# Patient Record
Sex: FEMALE | Race: WHITE | URBAN - METROPOLITAN AREA
[De-identification: names, ages, dates, MRNs, and addresses within clinical notes are randomized per-mention and may not be internally consistent; named-entity substitution may affect disease eponyms.]

---

## 2022-05-19 ENCOUNTER — LAB OUTSIDE AN ENCOUNTER (OUTPATIENT)
Dept: URBAN - METROPOLITAN AREA CLINIC 19 | Facility: CLINIC | Age: 47
End: 2022-05-19

## 2022-05-19 ENCOUNTER — OFFICE VISIT (OUTPATIENT)
Dept: URBAN - METROPOLITAN AREA CLINIC 19 | Facility: CLINIC | Age: 47
End: 2022-05-19
Payer: COMMERCIAL

## 2022-05-19 VITALS
BODY MASS INDEX: 23.53 KG/M2 | SYSTOLIC BLOOD PRESSURE: 116 MMHG | TEMPERATURE: 98.5 F | HEIGHT: 65 IN | WEIGHT: 141.2 LBS | DIASTOLIC BLOOD PRESSURE: 74 MMHG

## 2022-05-19 DIAGNOSIS — Z12.11 COLON CANCER SCREENING: ICD-10-CM

## 2022-05-19 DIAGNOSIS — R10.13 EPIGASTRIC PAIN: ICD-10-CM

## 2022-05-19 DIAGNOSIS — R11.0 NAUSEA: ICD-10-CM

## 2022-05-19 DIAGNOSIS — K59.09 CHANGE IN BOWEL MOVEMENTS INTERMITTENT CONSTIPATION. URGENCY IN THE MORNING.: ICD-10-CM

## 2022-05-19 PROCEDURE — 99204 OFFICE O/P NEW MOD 45 MIN: CPT | Performed by: INTERNAL MEDICINE

## 2022-05-19 RX ORDER — ONDANSETRON 4 MG/1
1 TABLET ON THE TONGUE AND ALLOW TO DISSOLVE TABLET, ORALLY DISINTEGRATING ORAL
Qty: 20 | Refills: 1 | OUTPATIENT
Start: 2022-05-19

## 2022-05-19 RX ORDER — FLUTICASONE PROPIONATE 50 UG/1
SPRAY, METERED NASAL
Qty: 0 | Refills: 0 | Status: ACTIVE | COMMUNITY
Start: 1900-01-01

## 2022-05-19 RX ORDER — PANTOPRAZOLE SODIUM 40 MG/1
1 TABLET TABLET, DELAYED RELEASE ORAL ONCE A DAY
Qty: 90 TABLET | Refills: 1 | OUTPATIENT
Start: 2022-05-19

## 2022-05-19 RX ORDER — BUSPIRONE HYDROCHLORIDE 10 MG/1
1 TABLET TABLET ORAL TWICE A DAY
Status: ACTIVE | COMMUNITY

## 2022-05-19 RX ORDER — PANTOPRAZOLE SODIUM 20 MG/1
1 TABLET TABLET, DELAYED RELEASE ORAL ONCE A DAY
Status: ACTIVE | COMMUNITY

## 2022-05-19 RX ORDER — LINACLOTIDE 145 UG/1
1 CAPSULE AT LEAST 30 MINUTES BEFORE THE FIRST MEAL OF THE DAY ON AN EMPTY STOMACH CAPSULE, GELATIN COATED ORAL ONCE A DAY
Qty: 30 | Refills: 3 | OUTPATIENT
Start: 2022-05-19 | End: 2022-09-16

## 2022-05-19 NOTE — HPI-TODAY'S VISIT:
Ms. Hewitt is a 47-year-old female who presents today for epigastric abdominal pain and constipation.  Patient reports she has been having epigastric abdominal pain for almost a year.  She also reports having nausea on and off.  Patient reports she was started on pantoprazole 40 mg and then decreased to 20 mg and off the medication.  She is now on 20 mg of pantoprazole and she is still having symptoms.  No heartburn or dysphagia.  Rare vomiting.  EGD years ago.  She reports at age 11 she had gastric ulcers.  Patient does report she was taking a lot of ibuprofen a year ago for knee pain and is not on it now.  Patient also reports she had constipation for years.  Patient reports she had surgery for endometriosis and is wondering if that is related.  Patient reports taking MiraLAX every night.  Patient reports eating with MiraLAX half a bowel movement every 1 to 3 days.  Some straining still.  Rare blood.  Lower abdominal pain and bloating.  Patient is never had a colonoscopy.  No family history of colon cancer.  Patient denies cardiac/kidney/lung disease, diabetes, blood thinners, and home O2.

## 2022-05-25 PROBLEM — 422587007: Status: ACTIVE | Noted: 2022-05-19

## 2022-05-25 PROBLEM — 79922009: Status: ACTIVE | Noted: 2022-05-19

## 2022-06-08 ENCOUNTER — OFFICE VISIT (OUTPATIENT)
Dept: URBAN - METROPOLITAN AREA SURGERY CENTER 31 | Facility: SURGERY CENTER | Age: 47
End: 2022-06-08
Payer: COMMERCIAL

## 2022-06-08 DIAGNOSIS — Z53.8 FAILED ATTEMPTED SURGICAL PROCEDURE: ICD-10-CM

## 2022-06-08 DIAGNOSIS — Z12.11 COLON CANCER SCREENING: ICD-10-CM

## 2022-06-08 DIAGNOSIS — K29.60 ADENOPAPILLOMATOSIS GASTRICA: ICD-10-CM

## 2022-06-08 PROBLEM — 305058001: Status: ACTIVE | Noted: 2022-05-19

## 2022-06-08 PROBLEM — 82934008: Status: ACTIVE | Noted: 2022-05-19

## 2022-06-08 PROCEDURE — 43239 EGD BIOPSY SINGLE/MULTIPLE: CPT | Performed by: INTERNAL MEDICINE

## 2022-06-08 PROCEDURE — 45378 DIAGNOSTIC COLONOSCOPY: CPT | Performed by: INTERNAL MEDICINE

## 2022-06-08 PROCEDURE — G8907 PT DOC NO EVENTS ON DISCHARG: HCPCS | Performed by: INTERNAL MEDICINE

## 2022-06-08 RX ORDER — PANTOPRAZOLE SODIUM 40 MG/1
1 TABLET TABLET, DELAYED RELEASE ORAL ONCE A DAY
Qty: 90 TABLET | Refills: 1 | Status: ACTIVE | COMMUNITY
Start: 2022-05-19

## 2022-06-08 RX ORDER — FLUTICASONE PROPIONATE 50 UG/1
SPRAY, METERED NASAL
Qty: 0 | Refills: 0 | Status: ACTIVE | COMMUNITY
Start: 1900-01-01

## 2022-06-08 RX ORDER — ONDANSETRON 4 MG/1
1 TABLET ON THE TONGUE AND ALLOW TO DISSOLVE TABLET, ORALLY DISINTEGRATING ORAL
Qty: 20 | Refills: 1 | Status: ACTIVE | COMMUNITY
Start: 2022-05-19

## 2022-06-08 RX ORDER — PANTOPRAZOLE SODIUM 20 MG/1
1 TABLET TABLET, DELAYED RELEASE ORAL ONCE A DAY
Status: ACTIVE | COMMUNITY

## 2022-06-08 RX ORDER — BUSPIRONE HYDROCHLORIDE 10 MG/1
1 TABLET TABLET ORAL TWICE A DAY
Status: ACTIVE | COMMUNITY

## 2022-06-08 RX ORDER — LINACLOTIDE 145 UG/1
1 CAPSULE AT LEAST 30 MINUTES BEFORE THE FIRST MEAL OF THE DAY ON AN EMPTY STOMACH CAPSULE, GELATIN COATED ORAL ONCE A DAY
Qty: 30 | Refills: 3 | Status: ACTIVE | COMMUNITY
Start: 2022-05-19 | End: 2022-09-16

## 2022-06-09 ENCOUNTER — OFFICE VISIT (OUTPATIENT)
Dept: URBAN - METROPOLITAN AREA SURGERY CENTER 31 | Facility: SURGERY CENTER | Age: 47
End: 2022-06-09
Payer: COMMERCIAL

## 2022-06-09 DIAGNOSIS — Z12.11 COLON CANCER SCREENING: ICD-10-CM

## 2022-06-09 PROCEDURE — 45378 DIAGNOSTIC COLONOSCOPY: CPT | Performed by: INTERNAL MEDICINE

## 2022-06-09 PROCEDURE — G8907 PT DOC NO EVENTS ON DISCHARG: HCPCS | Performed by: INTERNAL MEDICINE

## 2022-06-09 RX ORDER — FLUTICASONE PROPIONATE 50 UG/1
SPRAY, METERED NASAL
Qty: 0 | Refills: 0 | Status: ACTIVE | COMMUNITY
Start: 1900-01-01

## 2022-06-09 RX ORDER — BUSPIRONE HYDROCHLORIDE 10 MG/1
1 TABLET TABLET ORAL TWICE A DAY
Status: ACTIVE | COMMUNITY

## 2022-06-09 RX ORDER — PANTOPRAZOLE SODIUM 20 MG/1
1 TABLET TABLET, DELAYED RELEASE ORAL ONCE A DAY
Status: ACTIVE | COMMUNITY

## 2022-06-09 RX ORDER — ONDANSETRON 4 MG/1
1 TABLET ON THE TONGUE AND ALLOW TO DISSOLVE TABLET, ORALLY DISINTEGRATING ORAL
Qty: 20 | Refills: 1 | Status: ACTIVE | COMMUNITY
Start: 2022-05-19

## 2022-06-09 RX ORDER — PANTOPRAZOLE SODIUM 40 MG/1
1 TABLET TABLET, DELAYED RELEASE ORAL ONCE A DAY
Qty: 90 TABLET | Refills: 1 | Status: ACTIVE | COMMUNITY
Start: 2022-05-19

## 2022-06-09 RX ORDER — LINACLOTIDE 145 UG/1
1 CAPSULE AT LEAST 30 MINUTES BEFORE THE FIRST MEAL OF THE DAY ON AN EMPTY STOMACH CAPSULE, GELATIN COATED ORAL ONCE A DAY
Qty: 30 | Refills: 3 | Status: ACTIVE | COMMUNITY
Start: 2022-05-19 | End: 2022-09-16

## 2023-11-13 ENCOUNTER — OFFICE VISIT (OUTPATIENT)
Dept: URBAN - METROPOLITAN AREA CLINIC 19 | Facility: CLINIC | Age: 48
End: 2023-11-13
Payer: COMMERCIAL

## 2023-11-13 VITALS
BODY MASS INDEX: 23.32 KG/M2 | OXYGEN SATURATION: 100 % | DIASTOLIC BLOOD PRESSURE: 76 MMHG | HEIGHT: 65 IN | HEART RATE: 73 BPM | SYSTOLIC BLOOD PRESSURE: 120 MMHG | WEIGHT: 140 LBS

## 2023-11-13 DIAGNOSIS — R10.13 EPIGASTRIC PAIN: ICD-10-CM

## 2023-11-13 DIAGNOSIS — R11.0 NAUSEA: ICD-10-CM

## 2023-11-13 DIAGNOSIS — K59.04 CHRONIC IDIOPATHIC CONSTIPATION: ICD-10-CM

## 2023-11-13 DIAGNOSIS — R14.0 BLOATING: ICD-10-CM

## 2023-11-13 PROCEDURE — 99214 OFFICE O/P EST MOD 30 MIN: CPT | Performed by: NURSE PRACTITIONER

## 2023-11-13 PROCEDURE — 91065 BREATH HYDROGEN/METHANE TEST: CPT | Performed by: NURSE PRACTITIONER

## 2023-11-13 RX ORDER — LINACLOTIDE 145 UG/1
1 CAPSULE AT LEAST 30 MINUTES BEFORE THE FIRST MEAL OF THE DAY ON AN EMPTY STOMACH CAPSULE, GELATIN COATED ORAL ONCE A DAY
Qty: 30 | Refills: 1 | OUTPATIENT
Start: 2023-11-13 | End: 2024-01-12

## 2023-11-13 RX ORDER — ONDANSETRON 4 MG/1
1 TABLET ON THE TONGUE AND ALLOW TO DISSOLVE TABLET, ORALLY DISINTEGRATING ORAL
Qty: 20 | Refills: 1 | Status: ACTIVE | COMMUNITY
Start: 2022-05-19

## 2023-11-13 RX ORDER — PANTOPRAZOLE SODIUM 20 MG/1
1 TABLET TABLET, DELAYED RELEASE ORAL ONCE A DAY
Status: ON HOLD | COMMUNITY

## 2023-11-13 RX ORDER — PANTOPRAZOLE SODIUM 40 MG/1
1 TABLET TABLET, DELAYED RELEASE ORAL ONCE A DAY
Qty: 90 TABLET | Refills: 1 | Status: ON HOLD | COMMUNITY
Start: 2022-05-19

## 2023-11-13 RX ORDER — HYOSCYAMINE SULFATE 0.12 MG/1
1 TABLET UNDER THE TONGUE AND ALLOW TO DISSOLVE AS NEEDED TABLET SUBLINGUAL THREE TIMES A DAY
Qty: 60 | Refills: 1 | OUTPATIENT
Start: 2023-11-13 | End: 2024-01-12

## 2023-11-13 RX ORDER — BUSPIRONE HYDROCHLORIDE 10 MG/1
1 TABLET TABLET ORAL TWICE A DAY
Status: ACTIVE | COMMUNITY

## 2023-11-13 RX ORDER — FLUTICASONE PROPIONATE 50 UG/1
SPRAY, METERED NASAL
Qty: 0 | Refills: 0 | Status: ACTIVE | COMMUNITY
Start: 1900-01-01

## 2023-11-13 RX ORDER — PANTOPRAZOLE SODIUM 20 MG/1
1 TABLET TABLET, DELAYED RELEASE ORAL ONCE A DAY
Qty: 30 | Refills: 1 | OUTPATIENT
Start: 2023-11-13

## 2023-11-13 NOTE — HPI-TODAY'S VISIT:
48-year-old female presents to the office for abdominal pain. She was last seen in the office 5/19/2022 by Lucero Martin for epigastric pain, nausea, CIC.  She complains of epigastric pain for the last month, sharp, at random, nothing makes it better/worse. Nausea has been present for a few months, intermittent, wakes up nauseated, eating makes it worse. She takes NSAIDS prn, not taking any acid medications. Has been taking her dogs zofran.  Has lower abdominal pain for several months, constant soreness, worse with physical activity.  Stools are every 2-3 days, formed/hard, takes Miralax daily. Has previously tried Linzess with improvement.  Denies bloody/black stools stools, wt loss.  PLAN h pylori breath test, Linzess, levsin  Previous procedures Colonoscopy 6/9/2022 with Dr. Ruano colon was normal, medium hemorrhoids EGD 6/2022 localized gastritis, duodenal biopsy negative, stomach biopsy mild chronic inflammation.

## 2023-11-14 ENCOUNTER — TELEPHONE ENCOUNTER (OUTPATIENT)
Dept: URBAN - METROPOLITAN AREA CLINIC 19 | Facility: CLINIC | Age: 48
End: 2023-11-14

## 2023-11-15 ENCOUNTER — ERX REFILL RESPONSE (OUTPATIENT)
Dept: URBAN - METROPOLITAN AREA CLINIC 19 | Facility: CLINIC | Age: 48
End: 2023-11-15

## 2023-11-15 RX ORDER — PANTOPRAZOLE SODIUM 20 MG/1
TAKE 1 TABLET BY MOUTH EVERY DAY TABLET, DELAYED RELEASE ORAL
Qty: 90 TABLET | Refills: 0 | OUTPATIENT

## 2023-11-15 RX ORDER — PANTOPRAZOLE SODIUM 20 MG/1
1 TABLET TABLET, DELAYED RELEASE ORAL ONCE A DAY
Qty: 30 | Refills: 1 | OUTPATIENT

## 2023-11-21 ENCOUNTER — TELEPHONE ENCOUNTER (OUTPATIENT)
Dept: URBAN - METROPOLITAN AREA CLINIC 19 | Facility: CLINIC | Age: 48
End: 2023-11-21

## 2023-11-21 LAB — H PYLORI BREATH TEST: DETECTED

## 2023-11-21 RX ORDER — AMOXICILLIN 500 MG/1
2 CAPSULES CAPSULE ORAL TWICE A DAY
Qty: 56 CAPSULE | Refills: 0 | OUTPATIENT
Start: 2023-11-21 | End: 2023-12-05

## 2023-11-21 RX ORDER — PANTOPRAZOLE SODIUM 40 MG/1
1 TABLET TABLET, DELAYED RELEASE ORAL TWICE A DAY
Qty: 28 TABLET | Refills: 0 | OUTPATIENT
Start: 2023-11-21

## 2023-11-21 RX ORDER — CLARITHROMYCIN 500 MG/1
1 TABLET TABLET, FILM COATED ORAL
Qty: 28 TABLET | Refills: 0 | OUTPATIENT
Start: 2023-11-21 | End: 2023-12-05

## 2023-11-28 ENCOUNTER — TELEPHONE ENCOUNTER (OUTPATIENT)
Dept: URBAN - METROPOLITAN AREA CLINIC 19 | Facility: CLINIC | Age: 48
End: 2023-11-28

## 2023-11-28 RX ORDER — LINACLOTIDE 145 UG/1
1 CAPSULE AT LEAST 30 MINUTES BEFORE THE FIRST MEAL OF THE DAY ON AN EMPTY STOMACH CAPSULE, GELATIN COATED ORAL ONCE A DAY
Qty: 30 | Refills: 0
Start: 2023-11-13 | End: 2023-12-28

## 2023-12-27 ENCOUNTER — TELEPHONE ENCOUNTER (OUTPATIENT)
Dept: URBAN - METROPOLITAN AREA CLINIC 19 | Facility: CLINIC | Age: 48
End: 2023-12-27

## 2024-01-26 ENCOUNTER — OFFICE VISIT (OUTPATIENT)
Dept: URBAN - METROPOLITAN AREA CLINIC 19 | Facility: CLINIC | Age: 49
End: 2024-01-26
Payer: COMMERCIAL

## 2024-01-26 VITALS
HEIGHT: 65 IN | DIASTOLIC BLOOD PRESSURE: 68 MMHG | TEMPERATURE: 98.1 F | HEART RATE: 63 BPM | WEIGHT: 140 LBS | SYSTOLIC BLOOD PRESSURE: 112 MMHG | BODY MASS INDEX: 23.32 KG/M2

## 2024-01-26 DIAGNOSIS — B96.81 H PYLORI: ICD-10-CM

## 2024-01-26 DIAGNOSIS — R11.0 NAUSEA: ICD-10-CM

## 2024-01-26 DIAGNOSIS — K58.1 IRRITABLE BOWEL SYNDROME WITH CONSTIPATION: ICD-10-CM

## 2024-01-26 PROCEDURE — 99213 OFFICE O/P EST LOW 20 MIN: CPT | Performed by: NURSE PRACTITIONER

## 2024-01-26 RX ORDER — ONDANSETRON 4 MG/1
1 TABLET ON THE TONGUE AND ALLOW TO DISSOLVE TABLET, ORALLY DISINTEGRATING ORAL
Qty: 90 | Refills: 3
Start: 2022-05-19

## 2024-01-26 RX ORDER — BUSPIRONE HYDROCHLORIDE 10 MG/1
1 TABLET TABLET ORAL TWICE A DAY
Status: ACTIVE | COMMUNITY

## 2024-01-26 RX ORDER — FLUTICASONE PROPIONATE 50 UG/1
SPRAY, METERED NASAL
Qty: 0 | Refills: 0 | Status: ACTIVE | COMMUNITY
Start: 1900-01-01

## 2024-01-26 RX ORDER — PANTOPRAZOLE SODIUM 40 MG/1
1 TABLET TABLET, DELAYED RELEASE ORAL TWICE A DAY
Qty: 28 TABLET | Refills: 0 | Status: ACTIVE | COMMUNITY
Start: 2023-11-21

## 2024-01-26 RX ORDER — ONDANSETRON 4 MG/1
1 TABLET ON THE TONGUE AND ALLOW TO DISSOLVE TABLET, ORALLY DISINTEGRATING ORAL
Qty: 20 | Refills: 1 | Status: ACTIVE | COMMUNITY
Start: 2022-05-19

## 2024-01-26 NOTE — HPI-TODAY'S VISIT:
Ms. Hewitt is a 48-year-old female with PMH of CIC who presents today for follow-up.  She was last seen in GI clinic by Keira on 11/13/2023 for abdominal pain.  She complained of epigastric pain for over a month and intermittent nausea.  Also reported lower abdominal pain for several months worse with physical activity.  Reported stools every 2 to 3 days formed/hard, takes MiraLAX daily.  Had previously tried Linzess with improvement. Linzess 145 mcg/day ordered, and Levsin. 11/21/2023 H. pylori breath test was positive.  Triple therapy with amoxicillin/clarithromycin/pantoprazole for 2 weeks ordered.  She completed therapy.  She continues on pantoprazole. Today she reports that with the Linzess 145 mcg/day, she will have a BM about every 3 days and not a complete BM.  Still with very hard stools and a lot of straining.  Sometimes blood on tissue when wiping.  Reporting nausea which is more of an issue and decreased appetite.    Previous procedures Colonoscopy 6/9/2022 with Dr. Ruano colon was normal, medium hemorrhoids EGD 6/2022 localized gastritis, duodenal biopsy negative, stomach biopsy mild chronic inflammation.

## 2024-02-05 LAB — H PYLORI BREATH TEST: NOT DETECTED

## 2024-03-08 ENCOUNTER — OV EP (OUTPATIENT)
Dept: URBAN - METROPOLITAN AREA CLINIC 128 | Facility: CLINIC | Age: 49
End: 2024-03-08

## 2024-03-08 VITALS
SYSTOLIC BLOOD PRESSURE: 110 MMHG | TEMPERATURE: 97.5 F | WEIGHT: 136.4 LBS | DIASTOLIC BLOOD PRESSURE: 68 MMHG | HEIGHT: 65 IN | BODY MASS INDEX: 22.73 KG/M2

## 2024-03-08 RX ORDER — BUSPIRONE HYDROCHLORIDE 10 MG/1
1 TABLET TABLET ORAL TWICE A DAY
Status: ACTIVE | COMMUNITY

## 2024-03-08 RX ORDER — FLUTICASONE PROPIONATE 50 UG/1
SPRAY, METERED NASAL
Qty: 0 | Refills: 0 | Status: ACTIVE | COMMUNITY
Start: 1900-01-01

## 2024-03-08 RX ORDER — PANTOPRAZOLE SODIUM 40 MG/1
1 TABLET TABLET, DELAYED RELEASE ORAL TWICE A DAY
Qty: 28 TABLET | Refills: 0 | Status: ACTIVE | COMMUNITY
Start: 2023-11-21

## 2024-03-08 RX ORDER — ONDANSETRON 4 MG/1
1 TABLET ON THE TONGUE AND ALLOW TO DISSOLVE TABLET, ORALLY DISINTEGRATING ORAL
Qty: 90 | Refills: 3 | Status: ACTIVE | COMMUNITY
Start: 2022-05-19

## 2024-03-13 ENCOUNTER — OV EP (OUTPATIENT)
Dept: URBAN - METROPOLITAN AREA CLINIC 19 | Facility: CLINIC | Age: 49
End: 2024-03-13
Payer: COMMERCIAL

## 2024-03-13 VITALS
OXYGEN SATURATION: 98 % | HEART RATE: 88 BPM | BODY MASS INDEX: 22.86 KG/M2 | TEMPERATURE: 98.1 F | WEIGHT: 137.2 LBS | DIASTOLIC BLOOD PRESSURE: 72 MMHG | HEIGHT: 65 IN | SYSTOLIC BLOOD PRESSURE: 126 MMHG

## 2024-03-13 DIAGNOSIS — K59.04 CHRONIC IDIOPATHIC CONSTIPATION: ICD-10-CM

## 2024-03-13 PROCEDURE — 99212 OFFICE O/P EST SF 10 MIN: CPT | Performed by: NURSE PRACTITIONER

## 2024-03-13 RX ORDER — ONDANSETRON 4 MG/1
1 TABLET ON THE TONGUE AND ALLOW TO DISSOLVE TABLET, ORALLY DISINTEGRATING ORAL
Qty: 90 | Refills: 3 | Status: ACTIVE | COMMUNITY
Start: 2022-05-19

## 2024-03-13 RX ORDER — BUSPIRONE HYDROCHLORIDE 10 MG/1
1 TABLET TABLET ORAL TWICE A DAY
Status: ACTIVE | COMMUNITY

## 2024-03-13 RX ORDER — PANTOPRAZOLE SODIUM 40 MG/1
1 TABLET TABLET, DELAYED RELEASE ORAL TWICE A DAY
Qty: 28 TABLET | Refills: 0 | Status: ACTIVE | COMMUNITY
Start: 2023-11-21

## 2024-03-13 RX ORDER — FLUTICASONE PROPIONATE 50 UG/1
SPRAY, METERED NASAL
Qty: 0 | Refills: 0 | Status: ACTIVE | COMMUNITY
Start: 1900-01-01

## 2024-03-13 NOTE — HPI-TODAY'S VISIT:
Ms. Hewitt is a 49-year-old female with PMH of CIC who presents today for follow-up.   She was seen in GI clinic by Keira on 11/13/2023 for abdominal pain. She complained of epigastric pain for over a month and intermittent nausea. Also reported lower abdominal pain for several months worse with physical activity. Reported stools every 2 to 3 days formed/hard, despite MiraLAX daily.   11/21/2023 H. pylori breath test was positive. Triple therapy with amoxicillin/clarithromycin/pantoprazole for 2 weeks completed.   1/31/2024 H pylori breath test negative  She reports Linzess 290mcg/day at first caused her diarrhea then stopped working. Does ECT for depression so did get anethesia for these treatments. Wonders if this could be affecting her bowels. She reports she took [facility] colon prep which did end up giving her a good BM. That was a week ago. Has been having a regular BM since then. But worries she will go back to constipation again and what she can take since Linzess stopped working.    Previous procedures Colonoscopy 6/9/2022 with Dr. Ruano colon was normal, medium hemorrhoids EGD 6/2022 localized gastritis, duodenal biopsy negative, stomach biopsy mild chronic inflammation.

## 2024-07-11 ENCOUNTER — DASHBOARD ENCOUNTERS (OUTPATIENT)
Age: 49
End: 2024-07-11

## 2024-07-11 ENCOUNTER — OFFICE VISIT (OUTPATIENT)
Dept: URBAN - METROPOLITAN AREA CLINIC 19 | Facility: CLINIC | Age: 49
End: 2024-07-11
Payer: COMMERCIAL

## 2024-07-11 VITALS
OXYGEN SATURATION: 100 % | SYSTOLIC BLOOD PRESSURE: 130 MMHG | HEIGHT: 65 IN | TEMPERATURE: 98 F | DIASTOLIC BLOOD PRESSURE: 80 MMHG | BODY MASS INDEX: 22.99 KG/M2 | WEIGHT: 138 LBS | HEART RATE: 60 BPM

## 2024-07-11 DIAGNOSIS — K59.04 CHRONIC IDIOPATHIC CONSTIPATION: ICD-10-CM

## 2024-07-11 PROCEDURE — 99213 OFFICE O/P EST LOW 20 MIN: CPT | Performed by: NURSE PRACTITIONER

## 2024-07-11 RX ORDER — LUBIPROSTONE 24 UG/1
1 CAPSULE WITH FOOD AND WATER CAPSULE, GELATIN COATED ORAL TWICE A DAY
Qty: 60 | Refills: 3 | OUTPATIENT
Start: 2024-07-11 | End: 2024-11-07

## 2024-07-11 RX ORDER — PANTOPRAZOLE SODIUM 40 MG/1
1 TABLET TABLET, DELAYED RELEASE ORAL TWICE A DAY
Qty: 28 TABLET | Refills: 0 | Status: ACTIVE | COMMUNITY
Start: 2023-11-21

## 2024-07-11 RX ORDER — BUSPIRONE HYDROCHLORIDE 10 MG/1
1 TABLET TABLET ORAL TWICE A DAY
Status: ACTIVE | COMMUNITY

## 2024-07-11 RX ORDER — ONDANSETRON 4 MG/1
1 TABLET ON THE TONGUE AND ALLOW TO DISSOLVE TABLET, ORALLY DISINTEGRATING ORAL
Qty: 90 | Refills: 3 | Status: ACTIVE | COMMUNITY
Start: 2022-05-19

## 2024-07-11 RX ORDER — FLUTICASONE PROPIONATE 50 UG/1
SPRAY, METERED NASAL
Qty: 0 | Refills: 0 | Status: ACTIVE | COMMUNITY
Start: 1900-01-01

## 2024-07-11 NOTE — HPI-TODAY'S VISIT:
Ms. Hewitt is a 49-year-old female with PMH of CIC who presents today for severe constipation.  She was last seen in GI clinic 3/13/2024 and at that time reported no relief with Linzess I gave her Ibsrela samples to try and told her to call to let me know if it was effective or not.  She is here today with severe constipation to the point she showed up at [facility]. Causing her reflux and decreased appetite. Causing nausea and headaches.  Tried and failed all OTC and herbals and no relief. Was given lactulose in ER but not helping much. Taking TID. Had a small pellet BM yesterday.  States Ibsrela did help some but she called her insurance and they would not approve.             Previous history Colonoscopy 6/9/2022 with Dr. Ruano colon was normal, medium hemorrhoids EGD 6/2022 localized gastritis, duodenal biopsy negative, stomach biopsy mild chronic inflammation... Seen in GI clinic by Keira on 11/13/2023 for abdominal pain. She complained of epigastric pain for over a month and intermittent nausea. Also reported lower abdominal pain for several months worse with physical activity. Reported stools every 2 to 3 days formed/hard, despite MiraLAX daily.   11/21/2023 H. pylori breath test was positive. Triple therapy with amoxicillin/clarithromycin/pantoprazole for 2 weeks completed.   1/31/2024 H pylori breath test negative  She reported Linzess 290mcg/day at first caused her diarrhea then stopped working. Does ECT for depression so did get anethesia for these treatments.

## 2024-07-11 NOTE — PHYSICAL EXAM GASTROINTESTINAL
Abdomen , soft, diffuse abdominal tenderness, nondistended , no guarding or rigidity , Liver and Spleen,  no hepatosplenomegaly

## 2024-07-12 ENCOUNTER — TELEPHONE ENCOUNTER (OUTPATIENT)
Dept: URBAN - METROPOLITAN AREA CLINIC 19 | Facility: CLINIC | Age: 49
End: 2024-07-12

## 2024-07-12 LAB
A/G RATIO: 1.8
ALBUMIN: 4.6
ALKALINE PHOSPHATASE: 46
ALT (SGPT): 74
AST (SGOT): 61
BILIRUBIN, TOTAL: 0.5
BUN/CREATININE RATIO: 16
BUN: 17
CALCIUM: 9.8
CARBON DIOXIDE, TOTAL: 26
CHLORIDE: 101
CREATININE: 1.08
EGFR: 64
GLOBULIN, TOTAL: 2.6
GLUCOSE: 95
HEMATOCRIT: 45.7
HEMOGLOBIN: 14.5
MCH: 29.1
MCHC: 31.7
MCV: 91.6
MPV: 9.2
PLATELET COUNT: 344
POTASSIUM: 5
PROTEIN, TOTAL: 7.2
RDW: 12.6
RED BLOOD CELL COUNT: 4.99
SODIUM: 139
TSH W/REFLEX TO FT4: 2.84
WHITE BLOOD CELL COUNT: 5.6

## 2024-07-29 ENCOUNTER — TELEPHONE ENCOUNTER (OUTPATIENT)
Dept: URBAN - METROPOLITAN AREA MEDICAL CENTER 28 | Facility: MEDICAL CENTER | Age: 49
End: 2024-07-29

## 2024-07-31 ENCOUNTER — OFFICE VISIT (OUTPATIENT)
Dept: URBAN - METROPOLITAN AREA CLINIC 18 | Facility: CLINIC | Age: 49
End: 2024-07-31

## 2024-08-07 ENCOUNTER — OFFICE VISIT (OUTPATIENT)
Dept: URBAN - METROPOLITAN AREA CLINIC 18 | Facility: CLINIC | Age: 49
End: 2024-08-07

## 2024-08-21 ENCOUNTER — OFFICE VISIT (OUTPATIENT)
Dept: URBAN - METROPOLITAN AREA CLINIC 18 | Facility: CLINIC | Age: 49
End: 2024-08-21
Payer: COMMERCIAL

## 2024-08-21 DIAGNOSIS — R74.8 ABNORMAL LIVER ENZYMES: ICD-10-CM

## 2024-08-21 PROCEDURE — 76705 ECHO EXAM OF ABDOMEN: CPT | Performed by: INTERNAL MEDICINE

## 2024-10-10 ENCOUNTER — OFFICE VISIT (OUTPATIENT)
Dept: URBAN - METROPOLITAN AREA CLINIC 19 | Facility: CLINIC | Age: 49
End: 2024-10-10
Payer: COMMERCIAL

## 2024-10-10 VITALS
HEIGHT: 65 IN | HEART RATE: 81 BPM | TEMPERATURE: 97 F | SYSTOLIC BLOOD PRESSURE: 110 MMHG | WEIGHT: 139.2 LBS | BODY MASS INDEX: 23.19 KG/M2 | DIASTOLIC BLOOD PRESSURE: 80 MMHG

## 2024-10-10 DIAGNOSIS — R10.9 ABDOMINAL PAIN: ICD-10-CM

## 2024-10-10 DIAGNOSIS — K59.04 CHRONIC IDIOPATHIC CONSTIPATION: ICD-10-CM

## 2024-10-10 PROCEDURE — 99214 OFFICE O/P EST MOD 30 MIN: CPT | Performed by: NURSE PRACTITIONER

## 2024-10-10 RX ORDER — PANTOPRAZOLE SODIUM 40 MG/1
1 TABLET TABLET, DELAYED RELEASE ORAL TWICE A DAY
Qty: 28 TABLET | Refills: 0 | Status: ACTIVE | COMMUNITY
Start: 2023-11-21

## 2024-10-10 RX ORDER — BUSPIRONE HYDROCHLORIDE 10 MG/1
1 TABLET TABLET ORAL TWICE A DAY
Status: ACTIVE | COMMUNITY

## 2024-10-10 RX ORDER — LUBIPROSTONE 24 UG/1
1 CAPSULE WITH FOOD AND WATER CAPSULE, GELATIN COATED ORAL TWICE A DAY
Qty: 60 | Refills: 3 | Status: ACTIVE | COMMUNITY
Start: 2024-07-11 | End: 2024-11-07

## 2024-10-10 RX ORDER — LUBIPROSTONE 24 UG/1
1 CAPSULE WITH FOOD AND WATER CAPSULE, GELATIN COATED ORAL TWICE A DAY
Qty: 180 CAPSULE | Refills: 3
Start: 2024-07-11 | End: 2025-10-05

## 2024-10-10 RX ORDER — FLUTICASONE PROPIONATE 50 UG/1
SPRAY, METERED NASAL
Qty: 0 | Refills: 0 | Status: ACTIVE | COMMUNITY
Start: 1900-01-01

## 2024-10-10 RX ORDER — ONDANSETRON 4 MG/1
1 TABLET ON THE TONGUE AND ALLOW TO DISSOLVE TABLET, ORALLY DISINTEGRATING ORAL
Qty: 90 | Refills: 3 | Status: ACTIVE | COMMUNITY
Start: 2022-05-19

## 2024-10-10 NOTE — HPI-TODAY'S VISIT:
Ms. Hewitt is a 49-year-old female with PMH of CIC who presents for follow-up.  Last seen in 7/11/2024.  Tried to get her lubiprostone to see if it would be covered by her insurance and it was. She has been having regular BMs with this. She still has to take miralax on occasion. But overall constipation issues much improved and affordable.  TSH was normal CBC was normal  CMP revealed mildly elevated LFTs with AST 61, ALT 74, ALP 46, T. bili 0.5, creatinine 1.08  Recommended she stop taking daily ibuprofen.  She denied alcohol or Tylenol or herbal use or antibiotics.  Ultrasound 8/21/2024 - Normal No discrete liver lesion   Has been having low abdominal/pelvic pain which started a month ago - has had hysterectomy and has one ovary. Has history of ovarian cyst. She plans to f/u with GYN. Has been more nauseated but reports migraines, takes zofran PRN with relief.     - - - - - - - - - - - - - - - - - - - - - - - Prior summarized history:  Colonoscopy 6/9/2022 with Dr. Ruano colon was normal, medium hemorrhoids  EGD 6/2022 localized gastritis, duodenal biopsy negative, stomach biopsy mild chronic inflammation...  Seen in GI clinic by Keira on 11/13/2023 for abdominal pain. She complained of epigastric pain for over a month and intermittent nausea. Also reported lower abdominal pain for several months worse with physical activity. Reported stools every 2 to 3 days formed/hard, despite MiraLAX daily.   11/21/2023 H. pylori breath test was positive. Triple therapy with amoxicillin/clarithromycin/pantoprazole for 2 weeks completed.   1/31/2024 H pylori breath test negative  She reported Linzess 290mcg/day at first caused her diarrhea then stopped working.  Tried and failed OTCs including lactulose Constipation so bad she ended up in ER at one point States Ibsrela did help some but she called her insurance and they would not approve.  Does ECT for depression so did get anethesia for these treatments.

## 2024-10-10 NOTE — PHYSICAL EXAM GASTROINTESTINAL
Abdomen , soft, tenderness to LUQ, RLQ, LLQ, nondistended , no guarding or rigidity , no masses palpable ,Liver and Spleen,  no hepatosplenomegaly

## 2024-10-11 LAB
A/G RATIO: 1.7
ALBUMIN: 4
ALKALINE PHOSPHATASE: 32
ALT (SGPT): 25
AST (SGOT): 29
BILIRUBIN, TOTAL: 0.3
BUN/CREATININE RATIO: 18
BUN: 19
CALCIUM: 9.3
CARBON DIOXIDE, TOTAL: 28
CHLORIDE: 100
CREATININE: 1.07
EGFR: 64
GLOBULIN, TOTAL: 2.4
GLUCOSE: 88
HEMATOCRIT: 41.4
HEMOGLOBIN: 13.9
LIPASE: 29
MCH: 31.6
MCHC: 33.6
MCV: 94.1
MPV: 9.9
PLATELET COUNT: 288
POTASSIUM: 4
PROTEIN, TOTAL: 6.4
RDW: 11.9
RED BLOOD CELL COUNT: 4.4
SODIUM: 136
WHITE BLOOD CELL COUNT: 7.6

## 2025-03-11 ENCOUNTER — OFFICE VISIT (OUTPATIENT)
Dept: URBAN - METROPOLITAN AREA CLINIC 19 | Facility: CLINIC | Age: 50
End: 2025-03-11
Payer: COMMERCIAL

## 2025-03-11 VITALS
HEIGHT: 65 IN | BODY MASS INDEX: 22.49 KG/M2 | RESPIRATION RATE: 16 BRPM | HEART RATE: 68 BPM | OXYGEN SATURATION: 98 % | DIASTOLIC BLOOD PRESSURE: 80 MMHG | SYSTOLIC BLOOD PRESSURE: 124 MMHG | TEMPERATURE: 99.1 F | WEIGHT: 135 LBS

## 2025-03-11 DIAGNOSIS — K58.1 IRRITABLE BOWEL SYNDROME WITH CONSTIPATION: ICD-10-CM

## 2025-03-11 PROCEDURE — 99213 OFFICE O/P EST LOW 20 MIN: CPT | Performed by: NURSE PRACTITIONER

## 2025-03-11 RX ORDER — PANTOPRAZOLE SODIUM 40 MG/1
1 TABLET TABLET, DELAYED RELEASE ORAL TWICE A DAY
Qty: 28 TABLET | Refills: 0 | Status: ACTIVE | COMMUNITY
Start: 2023-11-21

## 2025-03-11 RX ORDER — ONDANSETRON 4 MG/1
1 TABLET ON THE TONGUE AND ALLOW TO DISSOLVE TABLET, ORALLY DISINTEGRATING ORAL
Qty: 90 | Refills: 3 | Status: ACTIVE | COMMUNITY
Start: 2022-05-19

## 2025-03-11 RX ORDER — FLUTICASONE PROPIONATE 50 UG/1
SPRAY, METERED NASAL
Qty: 0 | Refills: 0 | Status: ACTIVE | COMMUNITY
Start: 1900-01-01

## 2025-03-11 RX ORDER — BUSPIRONE HYDROCHLORIDE 10 MG/1
1 TABLET TABLET ORAL TWICE A DAY
Status: ACTIVE | COMMUNITY

## 2025-03-11 RX ORDER — LUBIPROSTONE 24 UG/1
1 CAPSULE WITH FOOD AND WATER CAPSULE, GELATIN COATED ORAL TWICE A DAY
Qty: 180 CAPSULE | Refills: 3 | Status: ACTIVE | COMMUNITY
Start: 2024-07-11 | End: 2025-10-05

## 2025-03-11 RX ORDER — TENAPANOR HYDROCHLORIDE 53.2 MG/1
1 TABLET IMMEDIATELY BEFORE MEALS TABLET ORAL TWICE A DAY
Qty: 180 TABLET | Refills: 3 | OUTPATIENT
Start: 2025-03-11 | End: 2026-03-05

## 2025-03-11 NOTE — HPI-TODAY'S VISIT:
Ms. Hewitt is a 50-year-old female with PMH of CIC who presents for follow-up.  Last seen in GI clinic 10/10/2024.  Labs at the time CBC, CMP, lipase unremarkable besides mildly elevated serum Cr 1.07  10/21/2024 CT A/P with contrast: Negative besides hepatic steatosis and mild hepatomegaly   She reports over the last few months, experiencing more constipation again with bloating and sorenss in lower abdomen even with Lubiprostone 24mcg BID. Seemed to just stopped working. Can go 2-3 days before having a BM. Has been trying to more miralax to help soften stools. Stools are hard but tries not to strain. She is following with GYN for ovarian cyst No new meds except Abilify but was having the issue before she started it (constipation is listed as a side effect) Does report recent finding of abnormal low thyroid and was started on thyroid medication.     - - - - - - - - - - - - - - - - - - - - - - - Prior summarized history:  Colonoscopy 6/9/2022 with Dr. Ruano colon was normal, medium hemorrhoids  EGD 6/2022 localized gastritis, duodenal biopsy negative, stomach biopsy mild chronic inflammation...  Seen in GI clinic by Keira on 11/13/2023 for abdominal pain. She complained of epigastric pain for over a month and intermittent nausea. Also reported lower abdominal pain for several months worse with physical activity. Reported stools every 2 to 3 days formed/hard, despite MiraLAX daily.  11/21/2023 H. pylori breath test was positive. Triple therapy with amoxicillin/clarithromycin/pantoprazole for 2 weeks completed.  1/31/2024 H pylori breath test negative  She reported Linzess 290mcg/day at first caused her diarrhea then stopped working.  Tried and failed OTCs including lactulose Constipation so bad she ended up in ER at one point States Ibsrela did help some but she called her insurance and they would not approve.  Does ECT for depression so did get anethesia for these treatments.    Last seen in 7/11/2024. Tried to get her lubiprostone to see if it would be covered by her insurance and it was. She has been having regular BMs with this. She still has to take miralax on occasion. But overall constipation issues much improved and affordable.  TSH was normal CBC was normal  CMP revealed mildly elevated LFTs with AST 61, ALT 74, ALP 46, T. bili 0.5, creatinine 1.08 Recommended she stop taking daily ibuprofen. She denied alcohol or Tylenol or herbal use or antibiotics.  Ultrasound 8/21/2024 - Normal No discrete liver lesion   Has had hysterectomy and has one ovary. Has history of ovarian cyst...

## 2025-03-11 NOTE — PHYSICAL EXAM GASTROINTESTINAL
Soft, mid abdomen and lower abdominal tenderness, nondistended, no guarding or rigidity, no hepatosplenomegaly

## 2025-03-13 ENCOUNTER — TELEPHONE ENCOUNTER (OUTPATIENT)
Dept: URBAN - METROPOLITAN AREA CLINIC 19 | Facility: CLINIC | Age: 50
End: 2025-03-13

## 2025-03-13 ENCOUNTER — P2P PATIENT RECORD (OUTPATIENT)
Age: 50
End: 2025-03-13

## 2025-03-14 ENCOUNTER — TELEPHONE ENCOUNTER (OUTPATIENT)
Dept: URBAN - METROPOLITAN AREA CLINIC 19 | Facility: CLINIC | Age: 50
End: 2025-03-14